# Patient Record
Sex: MALE | Race: BLACK OR AFRICAN AMERICAN | NOT HISPANIC OR LATINO | Employment: UNEMPLOYED | URBAN - METROPOLITAN AREA
[De-identification: names, ages, dates, MRNs, and addresses within clinical notes are randomized per-mention and may not be internally consistent; named-entity substitution may affect disease eponyms.]

---

## 2020-04-30 PROBLEM — R22.0 FACIAL SWELLING: Status: ACTIVE | Noted: 2020-04-30

## 2020-05-01 PROBLEM — K12.2 ABSCESS OF SUBMANDIBULAR REGION: Status: ACTIVE | Noted: 2020-05-01

## 2020-05-01 PROBLEM — L02.01 ABSCESS OF SUBMENTAL REGION: Status: ACTIVE | Noted: 2020-05-01

## 2020-05-14 ENCOUNTER — NURSE TRIAGE (OUTPATIENT)
Dept: ADMINISTRATIVE | Facility: CLINIC | Age: 40
End: 2020-05-14

## 2020-05-14 NOTE — TELEPHONE ENCOUNTER
Called patient on behalf of [facility]. Pt denied developing any fever, cough or shortness of breath since the procedure.

## 2020-05-15 ENCOUNTER — NURSE TRIAGE (OUTPATIENT)
Dept: ADMINISTRATIVE | Facility: CLINIC | Age: 40
End: 2020-05-15

## 2020-05-15 NOTE — TELEPHONE ENCOUNTER
Reason for Disposition   Caller has already spoken with another triager and has no further questions    Protocols used: NO CONTACT OR DUPLICATE CONTACT CALL-A-[place]

## 2022-06-20 PROBLEM — I10 ESSENTIAL HYPERTENSION: Status: ACTIVE | Noted: 2022-06-20

## 2023-04-16 PROBLEM — K13.21 LEUKOPLAKIA OF ORAL CAVITY: Status: ACTIVE | Noted: 2023-04-16

## 2023-05-15 PROBLEM — Z41.2 ENCOUNTER FOR CIRCUMCISION: Status: ACTIVE | Noted: 2023-05-15
